# Patient Record
Sex: FEMALE | Race: BLACK OR AFRICAN AMERICAN | NOT HISPANIC OR LATINO | Employment: PART TIME | URBAN - METROPOLITAN AREA
[De-identification: names, ages, dates, MRNs, and addresses within clinical notes are randomized per-mention and may not be internally consistent; named-entity substitution may affect disease eponyms.]

---

## 2019-06-12 PROBLEM — F33.1 MDD (MAJOR DEPRESSIVE DISORDER), RECURRENT EPISODE, MODERATE: Status: ACTIVE | Noted: 2019-06-12

## 2019-06-12 PROBLEM — E10.65 POORLY CONTROLLED TYPE 1 DIABETES MELLITUS: Status: ACTIVE | Noted: 2019-06-12

## 2019-06-12 PROBLEM — E87.5 HYPERKALEMIA: Status: ACTIVE | Noted: 2018-07-30

## 2019-06-12 PROBLEM — E87.20 ACIDOSIS, METABOLIC: Status: ACTIVE | Noted: 2018-07-30

## 2019-06-12 PROBLEM — Z79.4 LONG-TERM INSULIN USE: Status: ACTIVE | Noted: 2017-03-01

## 2019-06-12 PROBLEM — N17.9 AKI (ACUTE KIDNEY INJURY): Status: ACTIVE | Noted: 2019-06-12

## 2019-08-15 PROBLEM — N18.2 CKD STAGE 2 DUE TO TYPE 1 DIABETES MELLITUS: Status: ACTIVE | Noted: 2019-08-15

## 2019-08-15 PROBLEM — E87.5 HYPERKALEMIA: Status: RESOLVED | Noted: 2018-07-30 | Resolved: 2019-08-15

## 2019-08-15 PROBLEM — E87.6 HYPOKALEMIA: Status: ACTIVE | Noted: 2019-08-15

## 2019-08-15 PROBLEM — Z79.4 LONG-TERM INSULIN USE: Status: RESOLVED | Noted: 2017-03-01 | Resolved: 2019-08-15

## 2019-08-15 PROBLEM — E87.20 ACIDOSIS, METABOLIC: Status: RESOLVED | Noted: 2018-07-30 | Resolved: 2019-08-15

## 2019-08-15 PROBLEM — E10.22 CKD STAGE 2 DUE TO TYPE 1 DIABETES MELLITUS: Status: ACTIVE | Noted: 2019-08-15

## 2019-08-15 PROBLEM — E87.6 HYPOKALEMIA: Chronic | Status: ACTIVE | Noted: 2019-08-15

## 2019-08-15 PROBLEM — N17.9 AKI (ACUTE KIDNEY INJURY): Status: RESOLVED | Noted: 2019-06-12 | Resolved: 2019-08-15

## 2020-02-24 PROBLEM — E10.42 DIABETIC PERIPHERAL NEUROPATHY ASSOCIATED WITH TYPE 1 DIABETES MELLITUS: Status: ACTIVE | Noted: 2020-02-24

## 2020-02-24 PROBLEM — E10.3293: Status: ACTIVE | Noted: 2020-02-24

## 2020-03-24 PROBLEM — E11.10 DKA (DIABETIC KETOACIDOSIS): Status: ACTIVE | Noted: 2020-03-24

## 2020-03-24 PROBLEM — R11.2 NAUSEA AND VOMITING: Status: ACTIVE | Noted: 2020-03-24

## 2020-03-25 PROBLEM — R11.2 NAUSEA AND VOMITING: Status: RESOLVED | Noted: 2020-03-24 | Resolved: 2020-03-25

## 2020-03-25 PROBLEM — E87.6 HYPOKALEMIA: Status: RESOLVED | Noted: 2019-08-15 | Resolved: 2020-03-25

## 2020-04-08 PROBLEM — Z91.199 MEDICAL NON-COMPLIANCE: Status: ACTIVE | Noted: 2020-04-08

## 2022-07-03 PROBLEM — L20.89 OTHER ATOPIC DERMATITIS: Status: ACTIVE | Noted: 2022-07-03

## 2022-07-03 PROBLEM — L30.9 SEVERE ECZEMA: Status: ACTIVE | Noted: 2022-07-03

## 2022-07-03 PROBLEM — J30.89 ALLERGIC RHINITIS DUE TO DUST MITE: Status: ACTIVE | Noted: 2022-07-03

## 2022-07-03 PROBLEM — Z91.038 ALLERGY TO COCKROACHES: Status: ACTIVE | Noted: 2022-07-03

## 2022-07-03 PROBLEM — Z91.09 HOUSE DUST MITE ALLERGY: Status: ACTIVE | Noted: 2022-07-03

## 2022-07-08 ENCOUNTER — SPECIALTY PHARMACY (OUTPATIENT)
Dept: PHARMACY | Facility: CLINIC | Age: 22
End: 2022-07-08

## 2022-07-11 NOTE — TELEPHONE ENCOUNTER
PA denied as patient has not tried and failed Elidel / Eucrisa.     Sent inbasket to provider regarding the above

## 2022-07-14 NOTE — TELEPHONE ENCOUNTER
PA not required for Elidel. Pharmacy running for generic when suppose to be brand    Spoke to [facility] and they ordered for her to  today.     Advised patient of the above. Patient needs to try Elidel for at least two weeks per medicaid before approving Dupixent.     Advised patient [facility] will f/u on 7/29 to assess response to Elidel. Patient needs to send Beam[employer] message to provider regarding response to ELIDEL in two weeks. If no response to Elidel, [facility] can proceed with Dupixent. Patient agreeable to plan    Continue to f/u

## 2022-07-14 NOTE — TELEPHONE ENCOUNTER
Provider prescribed Elidel for patient however PA required    Annabel Suarez Key: [id number] - PA Case ID: [id number]    PA submitted via CM for Elidel

## 2022-07-29 NOTE — TELEPHONE ENCOUNTER
Incoming call from patient returning call from earlier today.  Informed patient that call was to assess response to Elidel and to message provider over [employer] about response to Elidel.

## 2022-07-29 NOTE — TELEPHONE ENCOUNTER
LVM to assess response to Elidel    Patient needs to send mychart message to provider on Elidel response.

## 2022-08-03 NOTE — TELEPHONE ENCOUNTER
LVM to assess response to Elidel. Patient needs to send mychart message to provider regarding response so [facility] can proceed with Dupixent

## 2022-08-10 NOTE — TELEPHONE ENCOUNTER
Call attempt 3- LVM to assess response to Elidel. Patient needs to send mychart message to provider regarding response so [facility] can proceed with Dupixent

## 2022-08-17 NOTE — TELEPHONE ENCOUNTER
Call attempt 4- LVM to assess response to Elidel. Patient needs to send mychart message to provider regarding response so [facility] can proceed with Dupixent    No response from the patient after 4 call attempts. Provider aware.     Closing referral at [facility]

## 2022-11-16 PROBLEM — E05.90 SUBCLINICAL HYPERTHYROIDISM: Status: ACTIVE | Noted: 2022-11-16

## 2023-03-09 ENCOUNTER — PATIENT MESSAGE (OUTPATIENT)
Dept: ADMINISTRATIVE | Facility: HOSPITAL | Age: 23
End: 2023-03-09

## 2023-03-09 ENCOUNTER — PATIENT OUTREACH (OUTPATIENT)
Dept: ADMINISTRATIVE | Facility: HOSPITAL | Age: 23
End: 2023-03-09

## 2023-05-18 ENCOUNTER — PATIENT OUTREACH (OUTPATIENT)
Dept: ADMINISTRATIVE | Facility: HOSPITAL | Age: 23
End: 2023-05-18

## 2023-05-18 ENCOUNTER — PATIENT MESSAGE (OUTPATIENT)
Dept: ADMINISTRATIVE | Facility: HOSPITAL | Age: 23
End: 2023-05-18

## 2023-06-20 ENCOUNTER — PATIENT MESSAGE (OUTPATIENT)
Dept: ADMINISTRATIVE | Facility: HOSPITAL | Age: 23
End: 2023-06-20

## 2023-06-20 ENCOUNTER — PATIENT OUTREACH (OUTPATIENT)
Dept: ADMINISTRATIVE | Facility: HOSPITAL | Age: 23
End: 2023-06-20

## 2023-11-11 PROBLEM — E86.1 INTRAVASCULAR VOLUME DEPLETION: Status: ACTIVE | Noted: 2023-11-11

## 2023-11-12 PROBLEM — J11.1 INFLUENZA: Status: ACTIVE | Noted: 2023-11-12

## 2023-11-13 PROBLEM — K29.00 ACUTE GASTRITIS WITHOUT HEMORRHAGE: Status: ACTIVE | Noted: 2023-11-13

## 2023-11-15 PROBLEM — N17.9 AKI (ACUTE KIDNEY INJURY): Status: RESOLVED | Noted: 2019-06-12 | Resolved: 2023-11-15

## 2023-11-15 PROBLEM — E11.10 DKA (DIABETIC KETOACIDOSIS): Status: RESOLVED | Noted: 2020-03-24 | Resolved: 2023-11-15

## 2023-11-15 PROBLEM — E10.22 CKD STAGE 2 DUE TO TYPE 1 DIABETES MELLITUS: Status: RESOLVED | Noted: 2019-08-15 | Resolved: 2023-11-15

## 2023-11-15 PROBLEM — R07.9 CHEST PAIN: Status: ACTIVE | Noted: 2023-11-15

## 2023-11-15 PROBLEM — J11.1 INFLUENZA: Status: RESOLVED | Noted: 2023-11-12 | Resolved: 2023-11-15

## 2023-11-15 PROBLEM — R11.0 NAUSEA: Status: ACTIVE | Noted: 2020-03-24

## 2023-11-15 PROBLEM — N18.2 CKD STAGE 2 DUE TO TYPE 1 DIABETES MELLITUS: Status: RESOLVED | Noted: 2019-08-15 | Resolved: 2023-11-15

## 2023-11-16 PROBLEM — R07.9 CHEST PAIN: Status: RESOLVED | Noted: 2023-11-15 | Resolved: 2023-11-16

## 2023-11-16 PROBLEM — E87.6 HYPOKALEMIA: Status: RESOLVED | Noted: 2019-08-15 | Resolved: 2023-11-16

## 2023-11-16 PROBLEM — E87.20 ACIDOSIS, METABOLIC: Status: RESOLVED | Noted: 2018-07-30 | Resolved: 2023-11-16

## 2023-11-19 PROBLEM — N17.9 AKI (ACUTE KIDNEY INJURY): Status: RESOLVED | Noted: 2019-06-12 | Resolved: 2023-11-19

## 2023-11-20 PROBLEM — E11.10 DKA (DIABETIC KETOACIDOSIS): Status: RESOLVED | Noted: 2020-03-24 | Resolved: 2023-11-20

## 2023-11-20 PROBLEM — E10.10 DIABETIC KETOACIDOSIS WITHOUT COMA ASSOCIATED WITH TYPE 1 DIABETES MELLITUS: Status: ACTIVE | Noted: 2020-03-24

## 2023-11-21 PROBLEM — E83.42 HYPOMAGNESEMIA: Status: ACTIVE | Noted: 2023-11-21

## 2023-11-22 PROBLEM — R07.9 CHEST PAIN: Status: ACTIVE | Noted: 2023-11-22

## 2023-11-22 PROBLEM — K21.9 GERD (GASTROESOPHAGEAL REFLUX DISEASE): Status: ACTIVE | Noted: 2023-11-15

## 2024-02-19 PROBLEM — E10.10 DIABETIC KETOACIDOSIS WITHOUT COMA ASSOCIATED WITH TYPE 1 DIABETES MELLITUS: Status: RESOLVED | Noted: 2020-03-24 | Resolved: 2024-02-19

## 2024-04-19 PROBLEM — R11.14 BILIOUS VOMITING WITH NAUSEA: Status: ACTIVE | Noted: 2024-04-19

## 2024-04-20 PROBLEM — E83.42 HYPOMAGNESEMIA: Status: RESOLVED | Noted: 2023-11-21 | Resolved: 2024-04-20

## 2024-04-20 PROBLEM — R07.9 CHEST PAIN: Status: RESOLVED | Noted: 2023-11-22 | Resolved: 2024-04-20

## 2024-04-20 PROBLEM — R11.0 NAUSEA: Status: RESOLVED | Noted: 2020-03-24 | Resolved: 2024-04-20

## 2024-04-20 PROBLEM — R11.14 BILIOUS VOMITING WITH NAUSEA: Status: RESOLVED | Noted: 2024-04-19 | Resolved: 2024-04-20

## 2024-04-20 PROBLEM — E10.42 DIABETIC PERIPHERAL NEUROPATHY ASSOCIATED WITH TYPE 1 DIABETES MELLITUS: Status: RESOLVED | Noted: 2020-02-24 | Resolved: 2024-04-20

## 2024-04-20 PROBLEM — E87.20 ACIDOSIS, METABOLIC: Status: RESOLVED | Noted: 2018-07-30 | Resolved: 2024-04-20

## 2024-04-20 PROBLEM — E05.90 SUBCLINICAL HYPERTHYROIDISM: Status: RESOLVED | Noted: 2022-11-16 | Resolved: 2024-04-20

## 2024-04-20 PROBLEM — E86.1 INTRAVASCULAR VOLUME DEPLETION: Status: RESOLVED | Noted: 2023-11-11 | Resolved: 2024-04-20

## 2024-04-20 PROBLEM — K21.9 GERD (GASTROESOPHAGEAL REFLUX DISEASE): Status: RESOLVED | Noted: 2023-11-15 | Resolved: 2024-04-20

## 2024-05-27 ENCOUNTER — PATIENT OUTREACH (OUTPATIENT)
Dept: ADMINISTRATIVE | Facility: HOSPITAL | Age: 24
End: 2024-05-27

## 2024-07-08 ENCOUNTER — PATIENT OUTREACH (OUTPATIENT)
Dept: ADMINISTRATIVE | Facility: HOSPITAL | Age: 24
End: 2024-07-08

## 2024-07-08 DIAGNOSIS — E10.9 TYPE 1 DIABETES MELLITUS WITHOUT COMPLICATION: Primary | ICD-10-CM

## 2024-07-22 PROBLEM — E10.10 DIABETIC KETOACIDOSIS WITHOUT COMA ASSOCIATED WITH TYPE 1 DIABETES MELLITUS: Status: RESOLVED | Noted: 2020-03-24 | Resolved: 2024-07-22

## 2024-09-23 PROBLEM — L20.9 ATOPIC DERMATITIS: Status: ACTIVE | Noted: 2024-09-23

## 2024-10-31 PROBLEM — H21.1X3: Status: ACTIVE | Noted: 2024-10-31

## 2024-10-31 PROBLEM — H21.02 HYPHEMA OF LEFT EYE: Status: ACTIVE | Noted: 2024-10-31

## 2024-10-31 PROBLEM — H40.52X3 NEOVASCULAR GLAUCOMA OF LEFT EYE, SEVERE STAGE: Status: ACTIVE | Noted: 2024-10-31

## 2025-03-10 PROBLEM — E83.39 HYPOPHOSPHATEMIA: Status: ACTIVE | Noted: 2025-03-10

## 2025-03-10 PROBLEM — R00.0 TACHYCARDIA: Status: ACTIVE | Noted: 2025-03-10

## 2025-03-10 PROBLEM — R10.9 ABDOMINAL PAIN: Status: ACTIVE | Noted: 2025-03-10

## 2025-03-11 PROBLEM — E10.10 DIABETIC KETOACIDOSIS WITHOUT COMA ASSOCIATED WITH TYPE 1 DIABETES MELLITUS: Status: RESOLVED | Noted: 2020-03-24 | Resolved: 2025-03-11

## 2025-03-11 PROBLEM — R11.0 NAUSEA: Status: RESOLVED | Noted: 2020-03-24 | Resolved: 2025-03-11

## 2025-03-11 PROBLEM — E83.39 HYPOPHOSPHATEMIA: Status: RESOLVED | Noted: 2025-03-10 | Resolved: 2025-03-11

## 2025-03-11 PROBLEM — R00.0 TACHYCARDIA: Status: RESOLVED | Noted: 2025-03-10 | Resolved: 2025-03-11

## 2025-03-11 PROBLEM — R10.9 ABDOMINAL PAIN: Status: RESOLVED | Noted: 2025-03-10 | Resolved: 2025-03-11

## 2025-06-09 PROBLEM — E87.0 HYPERNATREMIA: Status: ACTIVE | Noted: 2025-06-09

## 2025-06-09 PROBLEM — R11.2 INTRACTABLE NAUSEA AND VOMITING: Status: ACTIVE | Noted: 2025-06-09

## 2025-06-11 PROBLEM — E87.0 HYPERNATREMIA: Status: RESOLVED | Noted: 2025-06-09 | Resolved: 2025-06-11

## 2025-06-17 PROBLEM — K31.84 GASTROPARESIS: Status: ACTIVE | Noted: 2025-06-17

## 2025-06-18 PROBLEM — R07.9 CHEST PAIN: Status: RESOLVED | Noted: 2023-11-22 | Resolved: 2025-06-18

## 2025-06-18 PROBLEM — E11.10 DKA (DIABETIC KETOACIDOSIS): Status: RESOLVED | Noted: 2020-03-24 | Resolved: 2025-06-18

## 2025-06-23 PROBLEM — R93.3 ABNORMAL FINDING ON GI TRACT IMAGING: Status: ACTIVE | Noted: 2025-06-23

## 2025-06-23 PROBLEM — R11.10 VOMITING: Status: ACTIVE | Noted: 2024-04-19

## 2025-06-23 PROBLEM — R07.89 ATYPICAL CHEST PAIN: Status: ACTIVE | Noted: 2023-11-22

## 2025-08-04 ENCOUNTER — OUTPATIENT CASE MANAGEMENT (OUTPATIENT)
Dept: ADMINISTRATIVE | Facility: OTHER | Age: 25
End: 2025-08-04

## 2025-08-04 NOTE — LETTER
Annabel Suarez  [address] [address]      Dear Annabel Suarez,     I am writing from the Outpatient Care Management Department at [facility]. I received a referral from Outpatient Physician to contact you regarding any needs you may have. I was unable to reach you by phone. Please contact me for assistance.     I can be reached at [phone] Monday thru Friday from 8:00am to 4:30pm.      Additionally, [facility] also has a program with a nurse available 24/7 to answer questions or provide medical advice. [facility] can be reached at [phone].      Sincerely,        RENAY Guevara  Outpatient Care Management

## 2025-08-07 ENCOUNTER — OUTPATIENT CASE MANAGEMENT (OUTPATIENT)
Dept: ADMINISTRATIVE | Facility: OTHER | Age: 25
End: 2025-08-07

## 2025-08-16 PROBLEM — H42: Status: ACTIVE | Noted: 2025-08-16

## 2025-08-16 PROBLEM — H21.542 POSTERIOR SYNECHIAE, LEFT: Status: ACTIVE | Noted: 2025-08-16

## 2025-08-16 PROBLEM — H26.40 SECONDARY CATARACT OF LEFT EYE: Status: ACTIVE | Noted: 2025-08-16

## 2025-08-16 PROBLEM — E10.39: Status: ACTIVE | Noted: 2025-08-16

## 2025-08-18 ENCOUNTER — OUTPATIENT CASE MANAGEMENT (OUTPATIENT)
Dept: ADMINISTRATIVE | Facility: OTHER | Age: 25
End: 2025-08-18

## 2025-09-03 ENCOUNTER — OUTPATIENT CASE MANAGEMENT (OUTPATIENT)
Dept: ADMINISTRATIVE | Facility: OTHER | Age: 25
End: 2025-09-03